# Patient Record
(demographics unavailable — no encounter records)

---

## 2024-11-22 NOTE — HISTORY OF PRESENT ILLNESS
[N/A] : N/A [Denies] : Denies [No] : No [Yes] : Yes [Right upper extremity] : Right upper extremity [24g] : 24g [IV discontinued. Intact. No signs or symptoms of IV complications noted. Time: ___] : IV discontinued. Intact. No signs or symptoms of IV complications noted. Time: [unfilled] [Patient  instructed to seek medical attention with signs and symptoms of adverse effects] : Patient  instructed to seek medical attention with signs and symptoms of adverse effects [Patient left unit in no acute distress] : Patient left unit in no acute distress [Medications administered as ordered and tolerated well.] : Medications administered as ordered and tolerated well. [de-identified] : Radial [de-identified] : 1000 [de-identified] : Generic: Ocrelizumab 1mg Dx: Multiple sclerosis (G35) Provider Treatment Prescriber: Yannick Total Dose Administered: 600mg                    Start: 1037               Stop: 1407 Infusion Rate:  0955  Pre-infusion - 117/80, 61, 16 1055  100mL/hr - 119/78, 58, 16 1130  200mL/hr - 111/74, 73, 15 1200  250mL/hr - 113/73, 64, 16 1234  300mL/hr - 111/68, 77, 16 1310  300mL/hr - 109/69, 82, 18 1406  Observation - 113/65, 84, 16   Amount of Drug Waste: n/a IV insertion time: 1000                                 IV D/C time: 1407 NDC# 49599-147-26                         Lot#                        Exp: 3/2026 Buy and Bill: Y  CPT: 12544, 57555 Pre-Medication: Y Acetaminophen: 650mg Route: PO Total Dose Administered: 650mg             Start: 1005  Diphenhydramine: billing unit 50mg Route: PO Total Dose Administered: 50mg             Start: 1005       Methylprednisolone Billing unit 5mg - 125mg Route: IV Total Dose Administered: 125mg         Start: 1005 NDC# 2275-1839-64                            Lot# [id number]                            Exp: 12/2026  Famotidine: Billing unit 20mg - 10mg Route: IV Total Dose Administered: 10mg             Start: 1005 NDC# 76609-959-77                            Lot# [id number]                            Exp: 2/2025  Was there a treatment reaction? n/a Action taken: n/a Next Appointment: 5/30/25 at 0930. Ronna biggs.

## 2024-12-17 NOTE — HISTORY OF PRESENT ILLNESS
[de-identified] : 39M here in followup.  Since last seen 6 months ago she has not had any new issues. Hearing remains unchanged and the left sided tinnitus has decreased in severity. He notices it getting louder when he chews, but overall it has improved.  At prior visit seen for asymmetric left SNHL which initially presented with left tinnitus and vertigo. He has MS for years, good followup, on meds, unchanged MRI.  Audiogram from 6/2024: R: normal hearing, SRT 10, 100%, type A L: normal hearing sloping sharply to moderate HF SNHL, SRT 10, 100%, type A  ROS otherwise unremarkable

## 2024-12-17 NOTE — PHYSICAL EXAM
[de-identified] : eac clear, tm intact, me clear [Midline] : trachea located in midline position [Normal] : no rashes

## 2024-12-17 NOTE — ASSESSMENT
[FreeTextEntry1] : 39M here in followup. Since last seen 6 months ago she has not had any new issues. Hearing remains unchanged and the left sided tinnitus has decreased in severity. He notices it getting louder when he chews, but overall it has improved. At prior visit he was seen for asymmetric left sided SNHL which initially presented with left tinnitus and vertigo, all of which has improved a great deal. He has MS for years with good followup, on meds, with an unchanged MRI. Audiogram from prior visit shows an asymmetric moderate left sided HF SNHL >6khz, as above. Exam is unremarkable. He has no new sx and feels well. History and audiometry c/w sudden SNHL that was accompanied w vestibular sx initially; he is much better at this time w less tinnitus. MRI is unchanged and this was likely unrelated to his MS. For now, nothing further. RTO should any sx recur/develop.

## 2025-06-23 NOTE — HISTORY OF PRESENT ILLNESS
[N/A] : N/A [Denies] : Denies [No] : No [Yes] : Yes [IV discontinued. Intact. No signs or symptoms of IV complications noted. Time: ___] : IV discontinued. Intact. No signs or symptoms of IV complications noted. Time: [unfilled] [Patient  instructed to seek medical attention with signs and symptoms of adverse effects] : Patient  instructed to seek medical attention with signs and symptoms of adverse effects [Patient left unit in no acute distress] : Patient left unit in no acute distress [Medications administered as ordered and tolerated well.] : Medications administered as ordered and tolerated well. [de-identified] : Generic: Ocrelizumab 1mg Dx: Multiple sclerosis (G35) Provider Treatment Prescriber: Yannick Total Dose Administered: 600mg                    Start: 1000               Stop: 1253 Infusion Rate: 0919 - Pre-infusion - 136/76, 79, 17 1015 - 100mL/hr - 120/72, 79, 17 1030 - 200mL/hr - 113/65, 75, 17 1100 - 250mL/hr - 107/61, 74, 16 - Patient reported "Itchiness in throat". Infusion paused. "Itchiness" subsided and infusion restarted at 1119. 1158 - 250mL/hr - 110/71, 75, 16 1222 - 250mL/hr - 96/59, 75, 16 1253 - Complete - 113/63, 81, 16 1333 - Observation - 122/67, 83, 16 Amount of Drug Waste: n/a IV insertion time: 0923                                 IV D/C time: 1337 NDC# 84577-581-80                         Lot#                         Exp: 11/2026 Buy and Bill: N Pharmacy: [facility] [id number] CPT: 60919, 31922 Pre-Medication: Acetaminophen: 650mg Route: PO Total Dose Administered: 650mg             Start: 0929  Diphenhydramine: billing unit 50mg Route: PO Total Dose Administered: 50mg              Start: 0929             Methylprednisolone Billing unit 5mg -  Route: IV Total Dose Administered: 125mg             Start: 0929 NDC# 5076-7412-32       Lot# [id number]                            Exp: 12/2027  Famotidine: Billing unit 20mg - 10mg Route: IV Total Dose Administered: 10mg             Start: 3/2026 NDC# 20542-691-92                            Lot# [id number]                             Exp: 3/2026  Was there a treatment reaction? see above Action taken: see above Next Appointment: 1/2/26 at [phone]. Brenna biggs.